# Patient Record
Sex: MALE | Race: WHITE | NOT HISPANIC OR LATINO
[De-identification: names, ages, dates, MRNs, and addresses within clinical notes are randomized per-mention and may not be internally consistent; named-entity substitution may affect disease eponyms.]

---

## 2022-03-24 ENCOUNTER — NON-APPOINTMENT (OUTPATIENT)
Age: 72
End: 2022-03-24

## 2022-03-28 ENCOUNTER — APPOINTMENT (OUTPATIENT)
Dept: HEPATOLOGY | Facility: CLINIC | Age: 72
End: 2022-03-28
Payer: MEDICARE

## 2022-03-28 VITALS
RESPIRATION RATE: 16 BRPM | BODY MASS INDEX: 25.74 KG/M2 | DIASTOLIC BLOOD PRESSURE: 87 MMHG | HEART RATE: 51 BPM | WEIGHT: 207 LBS | HEIGHT: 75 IN | SYSTOLIC BLOOD PRESSURE: 158 MMHG

## 2022-03-28 DIAGNOSIS — I48.91 UNSPECIFIED ATRIAL FIBRILLATION: ICD-10-CM

## 2022-03-28 DIAGNOSIS — I10 ESSENTIAL (PRIMARY) HYPERTENSION: ICD-10-CM

## 2022-03-28 DIAGNOSIS — K86.9 DISEASE OF PANCREAS, UNSPECIFIED: ICD-10-CM

## 2022-03-28 DIAGNOSIS — E78.5 HYPERLIPIDEMIA, UNSPECIFIED: ICD-10-CM

## 2022-03-28 PROCEDURE — 99203 OFFICE O/P NEW LOW 30 MIN: CPT

## 2022-03-28 RX ORDER — ATORVASTATIN CALCIUM 40 MG/1
40 TABLET, FILM COATED ORAL
Refills: 0 | Status: ACTIVE | COMMUNITY

## 2022-03-28 RX ORDER — OLMESARTAN MEDOXOMIL 5 MG/1
5 TABLET, FILM COATED ORAL
Qty: 90 | Refills: 0 | Status: ACTIVE | COMMUNITY
Start: 2021-08-27

## 2022-03-28 RX ORDER — METOPROLOL SUCCINATE 50 MG/1
50 TABLET, EXTENDED RELEASE ORAL
Qty: 90 | Refills: 0 | Status: ACTIVE | COMMUNITY
Start: 2022-02-22

## 2022-03-28 RX ORDER — MUPIROCIN 20 MG/G
2 OINTMENT TOPICAL
Qty: 22 | Refills: 0 | Status: DISCONTINUED | COMMUNITY
Start: 2021-11-28

## 2022-03-28 RX ORDER — RIVAROXABAN 20 MG/1
20 TABLET, FILM COATED ORAL
Qty: 90 | Refills: 0 | Status: ACTIVE | COMMUNITY
Start: 2022-02-01

## 2022-03-28 NOTE — PHYSICAL EXAM
[Scleral Icterus] : No Scleral Icterus [Abdominal  Ascites] : no ascites [Asterixis] : no asterixis observed [Jaundice] : No jaundice [General Appearance - Alert] : alert [General Appearance - In No Acute Distress] : in no acute distress [Sclera] : the sclera and conjunctiva were normal [Neck Appearance] : the appearance of the neck was normal [Neck Cervical Mass (___cm)] : no neck mass was observed [Respiration, Rhythm And Depth] : normal respiratory rhythm and effort [Auscultation Breath Sounds / Voice Sounds] : lungs were clear to auscultation bilaterally [Heart Sounds] : normal S1 and S2 [Edema] : there was no peripheral edema [Bowel Sounds] : normal bowel sounds [Abdomen Tenderness] : non-tender [Abdomen Soft] : soft [] : no hepato-splenomegaly [Abdomen Mass (___ Cm)] : no abdominal mass palpated [Nail Clubbing] : no clubbing  or cyanosis of the fingernails [Skin Turgor] : normal skin turgor [Oriented To Time, Place, And Person] : oriented to person, place, and time

## 2022-03-28 NOTE — HISTORY OF PRESENT ILLNESS
[de-identified] : Mr. Abdirahman Marquez 71 yoM with pmhx HTN, HLD, Afib here for evaluation of elevated liver enzymes and elevated ferritin level. He had BW with his cardiologist in Feb 2022 showing isolated mildly elevated ALT of 44. He was recently seen by his GI, Dr. 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 Viral serology for hepatitis B surface antigen, hepatitis B core antibody, hepatitis B surface antibody are negative. Hepatitis C antibody is also negative. His autoimmune markers such as mitochondrial antibody, smooth muscle antibody, antinuclear antibody,  microsomal antibody, soluble liver antigen antibody, Alpha-1 antitrypsin level and ceruloplasmin were within normal range.Transglutaminase AB IgA  neg. MRI abdo without contrast 3/10/22 showed normal liver morphology, no hepatic lesion. 6 mm cystic lesion in the inferior pancreatic body without suspicious features. \par \par Dr. Alvarez recommended MRI of liver to quantify iron but has not done that yet. Pt states that he spoke with his son who is GI physician with [facility] and felt that he should get MRE instead. States that he feels well.  Denies abdominal pain, nausea, vomiting, melena, hematochezia, or hematemesis.\par \par Reviewed of records showed pt has had high transaminases , ,  back in 2016 but that has resolved.

## 2022-03-28 NOTE — ASSESSMENT
[FreeTextEntry1] : Mr. Abdirahman Marquez 71 yoM with pmhx HTN, HLD, Afib here for evaluation of elevated liver enzymes and elevated ferritin level. \par \par #Abnormal transaminases\par -Had elevated liver chemistries in 2016 but that resolved\par -BW Feb 2022 showing isolated mildly elevated ALT of 44. 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 \par -Will get MRE to evaluate for fibrosis, steatosis and iron deposit. \par \par #Pancreatic lesion\par -MRI abdo without contrast 3/10/22 showed normal liver morphology, no hepatic lesion. 6 mm cystic lesion in the inferior pancreatic body without suspicious features. Mildly increased in size, MRI from 2016 showed 4mm.\par -Will repeat MRI in 1 yr. \par \par Plan:\par MRE and will review result via phone.

## 2022-03-28 NOTE — REASON FOR VISIT
[Initial Evaluation] : an initial evaluation [FreeTextEntry1] : Elevated liver enzymes and elevated Ferritin

## 2022-04-22 ENCOUNTER — APPOINTMENT (OUTPATIENT)
Dept: MRI IMAGING | Facility: IMAGING CENTER | Age: 72
End: 2022-04-22
Payer: MEDICARE

## 2022-04-22 ENCOUNTER — RESULT REVIEW (OUTPATIENT)
Age: 72
End: 2022-04-22

## 2022-04-22 ENCOUNTER — OUTPATIENT (OUTPATIENT)
Dept: OUTPATIENT SERVICES | Facility: HOSPITAL | Age: 72
LOS: 1 days | End: 2022-04-22
Payer: MEDICARE

## 2022-04-22 DIAGNOSIS — R79.89 OTHER SPECIFIED ABNORMAL FINDINGS OF BLOOD CHEMISTRY: ICD-10-CM

## 2022-04-22 DIAGNOSIS — R74.8 ABNORMAL LEVELS OF OTHER SERUM ENZYMES: ICD-10-CM

## 2022-04-22 DIAGNOSIS — Z98.89 OTHER SPECIFIED POSTPROCEDURAL STATES: Chronic | ICD-10-CM

## 2022-04-22 PROCEDURE — 76391 MR ELASTOGRAPHY: CPT

## 2022-04-22 PROCEDURE — 74181 MRI ABDOMEN W/O CONTRAST: CPT | Mod: MH

## 2022-04-22 PROCEDURE — 74181 MRI ABDOMEN W/O CONTRAST: CPT | Mod: 26,MH

## 2022-04-22 PROCEDURE — 76391 MR ELASTOGRAPHY: CPT | Mod: 26

## 2022-05-23 ENCOUNTER — APPOINTMENT (OUTPATIENT)
Dept: HEPATOLOGY | Facility: CLINIC | Age: 72
End: 2022-05-23
Payer: MEDICARE

## 2022-05-23 VITALS
RESPIRATION RATE: 15 BRPM | HEIGHT: 75 IN | SYSTOLIC BLOOD PRESSURE: 130 MMHG | WEIGHT: 216 LBS | DIASTOLIC BLOOD PRESSURE: 78 MMHG | BODY MASS INDEX: 26.86 KG/M2 | HEART RATE: 55 BPM

## 2022-05-23 DIAGNOSIS — R74.8 ABNORMAL LEVELS OF OTHER SERUM ENZYMES: ICD-10-CM

## 2022-05-23 DIAGNOSIS — R79.89 OTHER SPECIFIED ABNORMAL FINDINGS OF BLOOD CHEMISTRY: ICD-10-CM

## 2022-05-23 PROCEDURE — 99213 OFFICE O/P EST LOW 20 MIN: CPT

## 2022-05-23 RX ORDER — OSELTAMIVIR PHOSPHATE 75 MG/1
75 CAPSULE ORAL
Qty: 10 | Refills: 0 | Status: DISCONTINUED | COMMUNITY
Start: 2022-05-12

## 2022-05-23 RX ORDER — PREDNISONE 20 MG/1
20 TABLET ORAL
Qty: 5 | Refills: 0 | Status: DISCONTINUED | COMMUNITY
Start: 2022-05-21

## 2022-05-23 RX ORDER — FLUTICASONE PROPIONATE 50 UG/1
50 SPRAY, METERED NASAL
Qty: 16 | Refills: 0 | Status: DISCONTINUED | COMMUNITY
Start: 2022-05-21

## 2022-05-23 NOTE — ASSESSMENT
[FreeTextEntry1] : Mr. Abdirahman Marquez 71 yoM with pmhx HTN, HLD, Afib here to discuss evaluation for elevated liver enzymes and elevated ferritin level. \par \par #Abnormal transaminases\par -Had elevated liver chemistries in 2016 but that resolved\par -BW Feb 2022 showing isolated mildly elevated ALT of 44. 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 MRE 4/22/22 showed no hepatic steatosis, no iron deposition and no fibrosis. \par \par #Pancreatic lesion\par -MRI abdo without contrast 3/10/22 showed normal liver morphology, no hepatic lesion. 6 mm cystic lesion in the inferior pancreatic body without suspicious features. Mildly increased in size, MRI from 2016 showed 4mm.\par -Will repeat MRI in 1 yr with his GI. \par \par Both BW and MRE showed no evidence of chronic liver disease or fibrosis. Will see patient on as needed basis.

## 2022-05-23 NOTE — PHYSICAL EXAM
[Scleral Icterus] : No Scleral Icterus [Abdominal  Ascites] : no ascites [Asterixis] : no asterixis observed [Jaundice] : No jaundice [General Appearance - Alert] : alert [General Appearance - In No Acute Distress] : in no acute distress [Sclera] : the sclera and conjunctiva were normal [Respiration, Rhythm And Depth] : normal respiratory rhythm and effort [Heart Rate And Rhythm] : heart rate was normal and rhythm regular [Bowel Sounds] : normal bowel sounds [Abdomen Soft] : soft [Abdomen Tenderness] : non-tender [] : no hepato-splenomegaly [Abdomen Mass (___ Cm)] : no abdominal mass palpated [Oriented To Time, Place, And Person] : oriented to person, place, and time

## 2022-05-23 NOTE — HISTORY OF PRESENT ILLNESS
[de-identified] : Mr. Abdirahman Marquez 71 yoM with pmhx HTN, HLD, Afib here to discuss evaluation for elevated liver enzymes and elevated ferritin level. He had BW with his cardiologist in Feb 2022 showing isolated mildly elevated ALT of 44. He was  seen by his GI, Dr. 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 Viral serology for hepatitis B surface antigen, hepatitis B core antibody, hepatitis B surface antibody are negative. Hepatitis C antibody is also negative. His autoimmune markers such as mitochondrial antibody, smooth muscle antibody, antinuclear antibody,  microsomal antibody, soluble liver antigen antibody, Alpha-1 antitrypsin level and ceruloplasmin were within normal range. Transglutaminase AB IgA  neg. MRI abdo without contrast 3/10/22 showed normal liver morphology, no hepatic lesion. 6 mm cystic lesion in the inferior pancreatic body without suspicious features. \par \par Dr. Alvarez recommended MRI of liver to quantify iron but has not done that yet. Pt states that he spoke with his son who is GI physician with [facility] and felt that he should get MRE instead. States that he feels well.  Denies abdominal pain, nausea, vomiting, melena, hematochezia, or hematemesis.\par \par Reviewed of records showed pt has had high transaminases , ,  back in 2016 but that has resolved. \par \par MRE 4/22/22 showed no hepatic steatosis, no iron deposition and no fibrosis.

## 2023-02-17 ENCOUNTER — APPOINTMENT (OUTPATIENT)
Dept: ORTHOPEDIC SURGERY | Facility: CLINIC | Age: 73
End: 2023-02-17
Payer: MEDICARE

## 2023-02-17 VITALS — HEIGHT: 75 IN | WEIGHT: 216 LBS | BODY MASS INDEX: 26.86 KG/M2

## 2023-02-17 DIAGNOSIS — S76.219A STRAIN OF ADDUCTOR MUSCLE, FASCIA AND TENDON OF UNSPECIFIED THIGH, INITIAL ENCOUNTER: ICD-10-CM

## 2023-02-17 PROCEDURE — 73502 X-RAY EXAM HIP UNI 2-3 VIEWS: CPT

## 2023-02-17 PROCEDURE — 99203 OFFICE O/P NEW LOW 30 MIN: CPT

## 2023-02-17 RX ORDER — METHYLPREDNISOLONE 4 MG/1
4 TABLET ORAL
Qty: 1 | Refills: 0 | Status: ACTIVE | COMMUNITY
Start: 2023-02-17 | End: 1900-01-01

## 2023-02-17 NOTE — HISTORY OF PRESENT ILLNESS
[0] : 0 [Occasional] : occasional [de-identified] : Has had soreness right groin past month, started getting out of a limo. Not interfering with his golf activities.  [] : no [FreeTextEntry1] : right groin  [FreeTextEntry5] : patient felt a pull while getting into a limo. He has been feeling pain in the groin.

## 2023-02-17 NOTE — PHYSICAL EXAM
[Right] : right hip with pelvis [AP] : anteroposterior [Lateral] : lateral [Mild arthritis (Tonnis Grade 1)] : Mild arthritis (Tonnis Grade 1) [] : no ecchymosis [FreeTextEntry9] : inferior acetabular osteophyte

## 2023-03-07 ENCOUNTER — APPOINTMENT (OUTPATIENT)
Dept: ORTHOPEDIC SURGERY | Facility: CLINIC | Age: 73
End: 2023-03-07
Payer: MEDICARE

## 2023-03-07 VITALS — WEIGHT: 208 LBS | HEIGHT: 75 IN | BODY MASS INDEX: 25.86 KG/M2

## 2023-03-07 DIAGNOSIS — M25.551 PAIN IN RIGHT HIP: ICD-10-CM

## 2023-03-07 PROCEDURE — 99213 OFFICE O/P EST LOW 20 MIN: CPT

## 2023-03-07 NOTE — PHYSICAL EXAM
[Right] : right hip with pelvis [Lateral] : lateral [AP] : anteroposterior [Mild arthritis (Tonnis Grade 1)] : Mild arthritis (Tonnis Grade 1) [] : no ecchymosis [FreeTextEntry9] : inferior acetabular osteophyte

## 2023-03-07 NOTE — HISTORY OF PRESENT ILLNESS
[Occasional] : occasional [0] : 0 [de-identified] : Has had soreness right groin past month, started getting out of a limo. Not interfering with his golf activities.  [] : no [FreeTextEntry1] : right groin  [FreeTextEntry5] : patient felt a pull while getting into a limo. He has been feeling pain in the groin.

## 2023-03-08 ENCOUNTER — FORM ENCOUNTER (OUTPATIENT)
Age: 73
End: 2023-03-08

## 2023-03-09 ENCOUNTER — APPOINTMENT (OUTPATIENT)
Dept: MRI IMAGING | Facility: CLINIC | Age: 73
End: 2023-03-09
Payer: MEDICARE

## 2023-03-09 PROCEDURE — 73721 MRI JNT OF LWR EXTRE W/O DYE: CPT | Mod: RT

## 2023-03-14 ENCOUNTER — APPOINTMENT (OUTPATIENT)
Dept: ORTHOPEDIC SURGERY | Facility: CLINIC | Age: 73
End: 2023-03-14
Payer: MEDICARE

## 2023-03-14 VITALS — WEIGHT: 208 LBS | BODY MASS INDEX: 25.86 KG/M2 | HEIGHT: 75 IN

## 2023-03-14 DIAGNOSIS — M16.11 UNILATERAL PRIMARY OSTEOARTHRITIS, RIGHT HIP: ICD-10-CM

## 2023-03-14 PROCEDURE — 99213 OFFICE O/P EST LOW 20 MIN: CPT

## 2023-03-14 NOTE — REASON FOR VISIT
[FreeTextEntry2] : 3/14/23- Had MRI: Impression: \par 1. Subtle cam deformity with moderate arthrosis, diffuse labral fraying, degeneration of tear, and joint effusion\par Multiseptated cystic lesion continuous with the lateral margin of the acetabulum extending from the anterior to \par posterior difficult to differentiate degenerative subchondral cyst versus labral cyst overlying the acetabulum.\par Additional imaging with contrast and CAT scan suggested for further evaluation.\par 2. 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 Gluteal tendinopathy, peritrochanteric edema.\par 4. Pelvic lipomatosis.

## 2023-03-14 NOTE — DISCUSSION/SUMMARY
[de-identified] : Has an assortment of hip arthritic findings including CAM deformity, labral tear with labral cyst, subtle AVN anterosuperior femoral head without collapse. He can go about his regular activities, will likely get worsening arthritis that may necessitate hip replacement in future

## 2023-03-14 NOTE — DATA REVIEWED
[MRI] : MRI [Right] : of the right [Hip] : hip [I reviewed the films/CD and agree] : I reviewed the films/CD and agree

## 2023-03-14 NOTE — HISTORY OF PRESENT ILLNESS
[0] : 0 [Occasional] : occasional [de-identified] : 3/7/23- Still with right groin pain, no relief from MDP\par \par Has had soreness right groin past month, started getting out of a limo. Not interfering with his golf activities.  [] : no [FreeTextEntry1] : right groin  [FreeTextEntry5] : patient felt a pull while getting into a limo. He has been feeling pain in the groin.

## 2023-10-31 ENCOUNTER — APPOINTMENT (OUTPATIENT)
Dept: MRI IMAGING | Facility: CLINIC | Age: 73
End: 2023-10-31
Payer: MEDICARE

## 2023-10-31 PROCEDURE — 72148 MRI LUMBAR SPINE W/O DYE: CPT | Mod: MH

## 2023-12-01 ENCOUNTER — APPOINTMENT (OUTPATIENT)
Dept: ORTHOPEDIC SURGERY | Facility: CLINIC | Age: 73
End: 2023-12-01
Payer: MEDICARE

## 2023-12-01 VITALS — HEIGHT: 75 IN | WEIGHT: 208 LBS | BODY MASS INDEX: 25.86 KG/M2

## 2023-12-01 DIAGNOSIS — M17.12 UNILATERAL PRIMARY OSTEOARTHRITIS, LEFT KNEE: ICD-10-CM

## 2023-12-01 DIAGNOSIS — S83.412A SPRAIN OF MEDIAL COLLATERAL LIGAMENT OF LEFT KNEE, INITIAL ENCOUNTER: ICD-10-CM

## 2023-12-01 PROCEDURE — 99213 OFFICE O/P EST LOW 20 MIN: CPT | Mod: 25

## 2023-12-01 PROCEDURE — 73562 X-RAY EXAM OF KNEE 3: CPT | Mod: LT

## 2023-12-01 PROCEDURE — 20610 DRAIN/INJ JOINT/BURSA W/O US: CPT | Mod: LT

## 2024-02-07 ENCOUNTER — APPOINTMENT (OUTPATIENT)
Dept: MRI IMAGING | Facility: CLINIC | Age: 74
End: 2024-02-07
Payer: MEDICARE

## 2024-02-07 ENCOUNTER — RESULT REVIEW (OUTPATIENT)
Age: 74
End: 2024-02-07

## 2024-02-07 PROCEDURE — 74181 MRI ABDOMEN W/O CONTRAST: CPT | Mod: MH

## 2024-02-27 ENCOUNTER — APPOINTMENT (OUTPATIENT)
Dept: RADIOLOGY | Facility: CLINIC | Age: 74
End: 2024-02-27
Payer: MEDICARE

## 2024-02-27 ENCOUNTER — APPOINTMENT (OUTPATIENT)
Dept: CT IMAGING | Facility: CLINIC | Age: 74
End: 2024-02-27
Payer: MEDICARE

## 2024-02-27 PROCEDURE — 72131 CT LUMBAR SPINE W/O DYE: CPT | Mod: MH

## 2024-02-27 PROCEDURE — 72110 X-RAY EXAM L-2 SPINE 4/>VWS: CPT

## 2024-07-02 ENCOUNTER — APPOINTMENT (OUTPATIENT)
Dept: MRI IMAGING | Facility: CLINIC | Age: 74
End: 2024-07-02
Payer: MEDICARE

## 2024-07-02 PROCEDURE — 72148 MRI LUMBAR SPINE W/O DYE: CPT | Mod: MH

## 2024-11-14 ENCOUNTER — APPOINTMENT (OUTPATIENT)
Dept: ORTHOPEDIC SURGERY | Facility: CLINIC | Age: 74
End: 2024-11-14

## 2024-11-21 ENCOUNTER — APPOINTMENT (OUTPATIENT)
Dept: ORTHOPEDIC SURGERY | Facility: CLINIC | Age: 74
End: 2024-11-21

## 2024-11-21 VITALS — HEIGHT: 75 IN | WEIGHT: 208 LBS | BODY MASS INDEX: 25.86 KG/M2

## 2024-11-21 DIAGNOSIS — M47.816 SPONDYLOSIS W/OUT MYELOPATHY OR RADICULOPATHY, LUMBAR REGION: ICD-10-CM

## 2024-11-21 PROCEDURE — 99214 OFFICE O/P EST MOD 30 MIN: CPT

## 2025-02-24 ENCOUNTER — APPOINTMENT (OUTPATIENT)
Dept: ORTHOPEDIC SURGERY | Facility: CLINIC | Age: 75
End: 2025-02-24

## 2025-02-25 ENCOUNTER — APPOINTMENT (OUTPATIENT)
Dept: ORTHOPEDIC SURGERY | Facility: CLINIC | Age: 75
End: 2025-02-25
Payer: MEDICARE

## 2025-02-25 VITALS — WEIGHT: 208 LBS | BODY MASS INDEX: 25.86 KG/M2 | HEIGHT: 75 IN

## 2025-02-25 DIAGNOSIS — S63.642A SPRAIN OF METACARPOPHALANGEAL JOINT OF LEFT THUMB, INITIAL ENCOUNTER: ICD-10-CM

## 2025-02-25 DIAGNOSIS — M18.12 UNILATERAL PRIMARY OSTEOARTHRITIS OF FIRST CARPOMETACARPAL JOINT, LEFT HAND: ICD-10-CM

## 2025-02-25 DIAGNOSIS — M79.645 PAIN IN LEFT FINGER(S): ICD-10-CM

## 2025-02-25 PROCEDURE — 99203 OFFICE O/P NEW LOW 30 MIN: CPT

## 2025-02-25 PROCEDURE — L3807: CPT | Mod: LT

## 2025-02-25 PROCEDURE — 73140 X-RAY EXAM OF FINGER(S): CPT | Mod: LT

## 2025-02-25 RX ORDER — DICLOFENAC SODIUM 10 MG/G
1 GEL TOPICAL DAILY
Qty: 1 | Refills: 0 | Status: ACTIVE | COMMUNITY
Start: 2025-02-25 | End: 1900-01-01

## 2025-03-17 ENCOUNTER — APPOINTMENT (OUTPATIENT)
Dept: INTERNAL MEDICINE | Facility: CLINIC | Age: 75
End: 2025-03-17
Payer: MEDICARE

## 2025-03-17 VITALS
OXYGEN SATURATION: 98 % | SYSTOLIC BLOOD PRESSURE: 160 MMHG | DIASTOLIC BLOOD PRESSURE: 78 MMHG | BODY MASS INDEX: 26.11 KG/M2 | HEART RATE: 57 BPM | TEMPERATURE: 98.3 F | WEIGHT: 210 LBS | HEIGHT: 75 IN

## 2025-03-17 DIAGNOSIS — M54.9 DORSALGIA, UNSPECIFIED: ICD-10-CM

## 2025-03-17 DIAGNOSIS — N20.0 CALCULUS OF KIDNEY: ICD-10-CM

## 2025-03-17 DIAGNOSIS — I48.91 UNSPECIFIED ATRIAL FIBRILLATION: ICD-10-CM

## 2025-03-17 DIAGNOSIS — I10 ESSENTIAL (PRIMARY) HYPERTENSION: ICD-10-CM

## 2025-03-17 DIAGNOSIS — N28.9 DISORDER OF KIDNEY AND URETER, UNSPECIFIED: ICD-10-CM

## 2025-03-17 DIAGNOSIS — G89.29 DORSALGIA, UNSPECIFIED: ICD-10-CM

## 2025-03-17 DIAGNOSIS — E78.5 HYPERLIPIDEMIA, UNSPECIFIED: ICD-10-CM

## 2025-03-17 PROCEDURE — 99204 OFFICE O/P NEW MOD 45 MIN: CPT

## 2025-03-17 PROCEDURE — G2211 COMPLEX E/M VISIT ADD ON: CPT

## 2025-03-17 PROCEDURE — 36415 COLL VENOUS BLD VENIPUNCTURE: CPT

## 2025-03-17 RX ORDER — AMOXICILLIN 875 MG/1
875 TABLET, FILM COATED ORAL
Qty: 20 | Refills: 0 | Status: DISCONTINUED | COMMUNITY
Start: 2024-12-28

## 2025-03-17 RX ORDER — MECLIZINE 25 MG/1
25 TABLET, CHEWABLE ORAL
Qty: 5 | Refills: 0 | Status: DISCONTINUED | COMMUNITY
Start: 2024-12-28

## 2025-03-19 ENCOUNTER — APPOINTMENT (OUTPATIENT)
Dept: ORTHOPEDIC SURGERY | Facility: CLINIC | Age: 75
End: 2025-03-19
Payer: MEDICARE

## 2025-03-19 VITALS — BODY MASS INDEX: 26.11 KG/M2 | HEIGHT: 75 IN | WEIGHT: 210 LBS

## 2025-03-19 DIAGNOSIS — M18.12 UNILATERAL PRIMARY OSTEOARTHRITIS OF FIRST CARPOMETACARPAL JOINT, LEFT HAND: ICD-10-CM

## 2025-03-19 DIAGNOSIS — S63.642A SPRAIN OF METACARPOPHALANGEAL JOINT OF LEFT THUMB, INITIAL ENCOUNTER: ICD-10-CM

## 2025-03-19 PROCEDURE — 99213 OFFICE O/P EST LOW 20 MIN: CPT

## 2025-03-23 LAB
ALBUMIN SERPL ELPH-MCNC: 4.4 G/DL
ALP BLD-CCNC: 89 U/L
ALT SERPL-CCNC: 27 U/L
ANION GAP SERPL CALC-SCNC: 9 MMOL/L
AST SERPL-CCNC: 30 U/L
BILIRUB SERPL-MCNC: 0.8 MG/DL
BUN SERPL-MCNC: 29 MG/DL
CALCIUM SERPL-MCNC: 9.6 MG/DL
CHLORIDE SERPL-SCNC: 107 MMOL/L
CHOLEST SERPL-MCNC: 148 MG/DL
CO2 SERPL-SCNC: 24 MMOL/L
CREAT SERPL-MCNC: 1.33 MG/DL
EGFRCR SERPLBLD CKD-EPI 2021: 56 ML/MIN/1.73M2
ESTIMATED AVERAGE GLUCOSE: 111 MG/DL
GLUCOSE SERPL-MCNC: 102 MG/DL
HBA1C MFR BLD HPLC: 5.5 %
HCT VFR BLD CALC: 46.1 %
HDLC SERPL-MCNC: 71 MG/DL
HGB BLD-MCNC: 14.9 G/DL
LDLC SERPL-MCNC: 67 MG/DL
MCHC RBC-ENTMCNC: 29.9 PG
MCHC RBC-ENTMCNC: 32.3 G/DL
MCV RBC AUTO: 92.6 FL
NONHDLC SERPL-MCNC: 77 MG/DL
PLATELET # BLD AUTO: 200 K/UL
POTASSIUM SERPL-SCNC: 4.6 MMOL/L
PROT SERPL-MCNC: 6.8 G/DL
RBC # BLD: 4.98 M/UL
RBC # FLD: 13.7 %
SODIUM SERPL-SCNC: 140 MMOL/L
TRIGL SERPL-MCNC: 49 MG/DL
WBC # FLD AUTO: 5.12 K/UL

## 2025-04-03 ENCOUNTER — APPOINTMENT (OUTPATIENT)
Dept: ORTHOPEDIC SURGERY | Facility: CLINIC | Age: 75
End: 2025-04-03
Payer: MEDICARE

## 2025-04-03 VITALS — HEIGHT: 75 IN | BODY MASS INDEX: 26.11 KG/M2 | WEIGHT: 210 LBS

## 2025-04-03 DIAGNOSIS — G89.29 DORSALGIA, UNSPECIFIED: ICD-10-CM

## 2025-04-03 DIAGNOSIS — M47.816 SPONDYLOSIS W/OUT MYELOPATHY OR RADICULOPATHY, LUMBAR REGION: ICD-10-CM

## 2025-04-03 DIAGNOSIS — M54.9 DORSALGIA, UNSPECIFIED: ICD-10-CM

## 2025-04-03 PROCEDURE — 99214 OFFICE O/P EST MOD 30 MIN: CPT

## 2025-04-04 ENCOUNTER — RESULT REVIEW (OUTPATIENT)
Age: 75
End: 2025-04-04

## 2025-04-04 ENCOUNTER — APPOINTMENT (OUTPATIENT)
Dept: MRI IMAGING | Facility: CLINIC | Age: 75
End: 2025-04-04
Payer: MEDICARE

## 2025-04-04 PROCEDURE — 72148 MRI LUMBAR SPINE W/O DYE: CPT

## 2025-04-24 ENCOUNTER — APPOINTMENT (OUTPATIENT)
Dept: ORTHOPEDIC SURGERY | Facility: CLINIC | Age: 75
End: 2025-04-24
Payer: MEDICARE

## 2025-04-24 VITALS — HEIGHT: 75 IN | BODY MASS INDEX: 26.11 KG/M2 | WEIGHT: 210 LBS

## 2025-04-24 DIAGNOSIS — G89.29 DORSALGIA, UNSPECIFIED: ICD-10-CM

## 2025-04-24 DIAGNOSIS — M54.9 DORSALGIA, UNSPECIFIED: ICD-10-CM

## 2025-04-24 DIAGNOSIS — M47.816 SPONDYLOSIS W/OUT MYELOPATHY OR RADICULOPATHY, LUMBAR REGION: ICD-10-CM

## 2025-04-24 PROCEDURE — 99214 OFFICE O/P EST MOD 30 MIN: CPT

## 2025-04-28 ENCOUNTER — APPOINTMENT (OUTPATIENT)
Dept: INTERNAL MEDICINE | Facility: CLINIC | Age: 75
End: 2025-04-28
Payer: MEDICARE

## 2025-04-28 VITALS
HEART RATE: 100 BPM | DIASTOLIC BLOOD PRESSURE: 70 MMHG | OXYGEN SATURATION: 98 % | HEIGHT: 75 IN | WEIGHT: 206 LBS | SYSTOLIC BLOOD PRESSURE: 100 MMHG | BODY MASS INDEX: 25.61 KG/M2 | TEMPERATURE: 99.4 F

## 2025-04-28 DIAGNOSIS — U09.9 OTHER FATIGUE: ICD-10-CM

## 2025-04-28 DIAGNOSIS — R53.83 OTHER FATIGUE: ICD-10-CM

## 2025-04-28 PROCEDURE — 96372 THER/PROPH/DIAG INJ SC/IM: CPT

## 2025-04-28 PROCEDURE — 99213 OFFICE O/P EST LOW 20 MIN: CPT | Mod: 25

## 2025-04-28 RX ORDER — CYANOCOBALAMIN 1000 UG/ML
1000 INJECTION, SOLUTION INTRAMUSCULAR; SUBCUTANEOUS
Qty: 0 | Refills: 0 | Status: COMPLETED | OUTPATIENT
Start: 2025-04-28

## 2025-04-28 RX ADMIN — CYANOCOBALAMINE 0 MCG/ML: 1000 INJECTION INTRAMUSCULAR; SUBCUTANEOUS at 00:00

## 2025-06-05 ENCOUNTER — APPOINTMENT (OUTPATIENT)
Dept: INTERNAL MEDICINE | Facility: CLINIC | Age: 75
End: 2025-06-05
Payer: MEDICARE

## 2025-06-05 VITALS
RESPIRATION RATE: 16 BRPM | DIASTOLIC BLOOD PRESSURE: 64 MMHG | TEMPERATURE: 99 F | OXYGEN SATURATION: 98 % | HEIGHT: 75 IN | SYSTOLIC BLOOD PRESSURE: 112 MMHG | HEART RATE: 80 BPM | BODY MASS INDEX: 26.11 KG/M2 | WEIGHT: 210 LBS

## 2025-06-05 DIAGNOSIS — R53.83 OTHER FATIGUE: ICD-10-CM

## 2025-06-05 DIAGNOSIS — R11.0 NAUSEA: ICD-10-CM

## 2025-06-05 DIAGNOSIS — U09.9 OTHER FATIGUE: ICD-10-CM

## 2025-06-05 PROCEDURE — 96372 THER/PROPH/DIAG INJ SC/IM: CPT

## 2025-06-05 PROCEDURE — 99214 OFFICE O/P EST MOD 30 MIN: CPT | Mod: 25

## 2025-06-05 RX ORDER — ONDANSETRON 8 MG/1
8 TABLET ORAL EVERY 8 HOURS
Qty: 30 | Refills: 3 | Status: ACTIVE | COMMUNITY
Start: 2025-06-05 | End: 1900-01-01

## 2025-06-05 RX ORDER — CYANOCOBALAMIN 1000 UG/ML
1000 INJECTION, SOLUTION INTRAMUSCULAR; SUBCUTANEOUS
Qty: 0 | Refills: 0 | Status: COMPLETED | OUTPATIENT
Start: 2025-06-05

## 2025-06-05 RX ADMIN — CYANOCOBALAMIN 1 MCG/ML: 1000 INJECTION, SOLUTION INTRAMUSCULAR at 00:00

## 2025-08-20 ENCOUNTER — APPOINTMENT (OUTPATIENT)
Dept: INTERNAL MEDICINE | Facility: CLINIC | Age: 75
End: 2025-08-20
Payer: MEDICARE

## 2025-08-20 VITALS
OXYGEN SATURATION: 98 % | WEIGHT: 210 LBS | DIASTOLIC BLOOD PRESSURE: 80 MMHG | TEMPERATURE: 98.4 F | SYSTOLIC BLOOD PRESSURE: 148 MMHG | BODY MASS INDEX: 26.11 KG/M2 | HEIGHT: 75 IN | RESPIRATION RATE: 12 BRPM | HEART RATE: 63 BPM

## 2025-08-20 VITALS — SYSTOLIC BLOOD PRESSURE: 138 MMHG | DIASTOLIC BLOOD PRESSURE: 70 MMHG

## 2025-08-20 DIAGNOSIS — R11.0 NAUSEA: ICD-10-CM

## 2025-08-20 DIAGNOSIS — Z87.898 PERSONAL HISTORY OF OTHER SPECIFIED CONDITIONS: ICD-10-CM

## 2025-08-20 DIAGNOSIS — R25.1 TREMOR, UNSPECIFIED: ICD-10-CM

## 2025-08-20 PROCEDURE — 36415 COLL VENOUS BLD VENIPUNCTURE: CPT

## 2025-08-20 PROCEDURE — 99213 OFFICE O/P EST LOW 20 MIN: CPT

## 2025-08-20 PROCEDURE — G2211 COMPLEX E/M VISIT ADD ON: CPT

## 2025-08-30 LAB
ALBUMIN SERPL ELPH-MCNC: 4.2 G/DL
ALP BLD-CCNC: 75 U/L
ALT SERPL-CCNC: 20 U/L
ANION GAP SERPL CALC-SCNC: 14 MMOL/L
AST SERPL-CCNC: 23 U/L
BASOPHILS # BLD AUTO: 0.04 K/UL
BASOPHILS NFR BLD AUTO: 0.7 %
BILIRUB SERPL-MCNC: 0.7 MG/DL
BUN SERPL-MCNC: 23 MG/DL
CALCIUM SERPL-MCNC: 9.4 MG/DL
CHLORIDE SERPL-SCNC: 106 MMOL/L
CHOLEST SERPL-MCNC: 142 MG/DL
CO2 SERPL-SCNC: 21 MMOL/L
CREAT SERPL-MCNC: 1.2 MG/DL
EGFRCR SERPLBLD CKD-EPI 2021: 63 ML/MIN/1.73M2
EOSINOPHIL # BLD AUTO: 0.11 K/UL
EOSINOPHIL NFR BLD AUTO: 1.9 %
ESTIMATED AVERAGE GLUCOSE: 105 MG/DL
FERRITIN SERPL-MCNC: 227 NG/ML
FOLATE SERPL-MCNC: 11.1 NG/ML
GLUCOSE SERPL-MCNC: 92 MG/DL
HBA1C MFR BLD HPLC: 5.3 %
HCT VFR BLD CALC: 45.8 %
HDLC SERPL-MCNC: 66 MG/DL
HGB BLD-MCNC: 14.9 G/DL
IMM GRANULOCYTES NFR BLD AUTO: 0.3 %
IRON SATN MFR SERPL: 33 %
IRON SERPL-MCNC: 85 UG/DL
LDLC SERPL-MCNC: 63 MG/DL
LYMPHOCYTES # BLD AUTO: 1.49 K/UL
LYMPHOCYTES NFR BLD AUTO: 25.6 %
MAN DIFF?: NORMAL
MCHC RBC-ENTMCNC: 30.8 PG
MCHC RBC-ENTMCNC: 32.5 G/DL
MCV RBC AUTO: 94.8 FL
MONOCYTES # BLD AUTO: 0.52 K/UL
MONOCYTES NFR BLD AUTO: 8.9 %
NEUTROPHILS # BLD AUTO: 3.65 K/UL
NEUTROPHILS NFR BLD AUTO: 62.6 %
NONHDLC SERPL-MCNC: 75 MG/DL
PLATELET # BLD AUTO: 197 K/UL
POTASSIUM SERPL-SCNC: 4.5 MMOL/L
PROT SERPL-MCNC: 6.7 G/DL
RBC # BLD: 4.83 M/UL
RBC # FLD: 13.6 %
SODIUM SERPL-SCNC: 140 MMOL/L
TIBC SERPL-MCNC: 260 UG/DL
TRIGL SERPL-MCNC: 59 MG/DL
TSH SERPL-ACNC: 1.24 UIU/ML
UIBC SERPL-MCNC: 175 UG/DL
VIT B12 SERPL-MCNC: 542 PG/ML
WBC # FLD AUTO: 5.83 K/UL

## 2025-09-15 ENCOUNTER — APPOINTMENT (OUTPATIENT)
Dept: INTERNAL MEDICINE | Facility: CLINIC | Age: 75
End: 2025-09-15